# Patient Record
(demographics unavailable — no encounter records)

---

## 2024-11-27 NOTE — REASON FOR VISIT
[Initial Evaluation] : an initial evaluation [Patient] : patient [Parents] : parents [FreeTextEntry1] : Left lateral condyle fracture and wrist contusion sustained on 11/11/24 [TWNoteComboBox1] : Liberian

## 2024-11-27 NOTE — ASSESSMENT
[FreeTextEntry1] : 6-year-old female with a left lateral condyle fracture sustained on 11/11/2024, 2.5 weeks ago, when she fell off her scooter.  -We discussed the history, physical exam, and all available radiographs at length during today's visit with patient and her parent/guardian who served as an independent historian due to child's age and unreliable nature of history. -[place] [facility] [place] FOREARM 2 VIEWS,  HUMERUS MIN 2 VIEWS, WRIST COMP MIN 3 VIEWs and ELBOW COMP MIN 3V [place] [facility] [place] and independently reviewed during today's visit. nondisplaced lateral condyle fracture noted. no signs of interval healing. Skeletally immature patient - Left elbow 3 view radiographs were obtained and independently reviewed during today's visit. Continued visualization of a nondisplaced lateral condyle fracture. Early interval healing noted.  -The etiology, pathoanatomy, treatment modalities, and expected natural history of the injury were discussed at length today. -Clinically, she tolerated her long-arm splint well.  Upon splint removal she has noted to have persistent discomfort about the elbow and wrist. -At this time, based on patient age and current fracture alignment, we recommended conservative management with cast immobilization. -She was placed into a well-padded long-arm cast today.  Cast care instructions reviewed. -Nonweightbearing on the left upper extremity.  Sling at all times. -left elbow 3 view and wrist 3 view IN CAST radiographs were obtained and independently reviewed during today's visit. Continued visualization of a nondisplaced lateral condyle fracture. Early interval healing noted.  -OTC NSAIDs as needed -Absolutely no gym, recess, sports, rough play.  School note provided today. -We will plan to see her back in clinic in approximately 3 weeks for reevaluation and new left elbow radiographs OUT OF CAST.  All questions and concerns were addressed today. Parent and patient verbalize understanding and agree with plan of care.   I, Corinna Herrera, have acted as a scribe and documented the above information for Dr. Garnica

## 2024-11-27 NOTE — DATA REVIEWED
[de-identified] : Left FOREARM 2 VIEWS,  HUMERUS MIN 2 VIEWS, WRIST COMP MIN 3 VIEWs and ELBOW COMP MIN 3V [place] [facility] [place] and independently reviewed during today's visit. nondisplaced lateral condyle fracture noted. no signs of interval healing. Skeletally immature patient  Left elbow 3 view radiographs were obtained and independently reviewed during today's visit. Continued visualization of a nondisplaced lateral condyle fracture. Early interval healing noted.   left elbow 3 view and wrist 3 view IN CAST radiographs were obtained and independently reviewed during today's visit. Continued visualization of a nondisplaced lateral condyle fracture. Early interval healing noted.

## 2024-11-27 NOTE — REVIEW OF SYSTEMS
[Change in Activity] : change in activity [Joint Pains] : arthralgias [Joint Swelling] : joint swelling  [Appropriate Age Development] : development appropriate for age [Redness] : no redness [Murmur] : no murmur [Wheezing] : no wheezing [Vomiting] : no vomiting

## 2024-11-27 NOTE — HISTORY OF PRESENT ILLNESS
[FreeTextEntry1] : Manuela is a 6-year-old female with a left upper extremity injury sustained on 11/11/2024.  Per report she fell off her scooter and had immediate pain and discomfort about the left elbow.  There was also significant swelling about the elbow.  She presented to [facility] where radiographs were obtained, and no obvious fracture was noted.  She was placed into a splint, and it was recommended she follow-up with pediatric orthopedics.  Today, her parents report she is overall doing well.  They left the splint in place since it was provided.  She can flex and extend her fingers without discomfort.  She is not requiring pain medication.  She denies any pain about the ipsilateral shoulder.  She presents today for initial evaluation of her left upper extremity injury.

## 2024-11-27 NOTE — PHYSICAL EXAM
[FreeTextEntry1] : GENERAL: alert, cooperative, in NAD SKIN: The skin is intact, warm, pink and dry over the area examined. EYES: Normal conjunctiva, normal eyelids and pupils were equal and round. ENT: normal ears, normal nose and normal lips. CARDIOVASCULAR: brisk capillary refill, but no peripheral edema. RESPIRATORY: The patient is in no apparent respiratory distress. They're taking full deep breaths without use of accessory muscles or evidence of audible wheezes or stridor without the use of a stethoscope. Normal respiratory effort. ABDOMEN: not examined.  Left upper extremity-  long arm splint in place, removed for examination Scattered bruising and swelling about the wrist and elbow Global tenderness about the elbow Mild discomfort about the distal radius ROM of the elbow deferred Mild discomfort with gentle wrist range of motion. Fingers are warm, pink, and moving freely.  Radial pulse is +2.  Brisk capillary refill in all 5 fingers. Sensation is intact to light touch distally.  Nerve innervation of the upper extremity is intact.  Able to perform a thumbs up maneuver (PIN), OK sign (AIN), finger crossover (ulnar).

## 2024-12-18 NOTE — DATA REVIEWED
[de-identified] : Left elbow 3 view OUT OF CAST radiographs were obtained and independently reviewed during today's visit. Continued visualization of a nondisplaced lateral condyle fracture.  Now with interval healing.  Anterior humeral line intersects the capitellum. Radiocapitellar articulation is intact.   Left wrist 3 view OUT OF CAST radiographs were obtained and independently reviewed during today's visit.  There are no signs of fracture, dislocation, bony injury noted.  No periosteal reaction or callus formation noted

## 2024-12-18 NOTE — PHYSICAL EXAM
[FreeTextEntry1] : GENERAL: alert, cooperative, in NAD SKIN: The skin is intact, warm, pink and dry over the area examined. EYES: Normal conjunctiva, normal eyelids and pupils were equal and round. ENT: normal ears, normal nose and normal lips. CARDIOVASCULAR: brisk capillary refill, but no peripheral edema. RESPIRATORY: The patient is in no apparent respiratory distress. They're taking full deep breaths without use of accessory muscles or evidence of audible wheezes or stridor without the use of a stethoscope. Normal respiratory effort. ABDOMEN: not examined.  Left upper extremity: long arm cast in place, removed for examination Scattered bruising about the wrist and elbow No further swelling No further tenderness about the elbow Mild discomfort about the distal radius ROM of the elbow deferred Mild discomfort with gentle wrist range of motion. Fingers are warm, pink, and moving freely.  Radial pulse is +2.  Brisk capillary refill in all 5 fingers. Sensation is intact to light touch distally.  Nerve innervation of the upper extremity is intact.  Able to perform a thumbs up maneuver (PIN), OK sign (AIN), finger crossover (ulnar).

## 2024-12-18 NOTE — HISTORY OF PRESENT ILLNESS
[FreeTextEntry1] : Manuela is a 6-year-old female with a left upper extremity injury sustained on 11/11/2024.  Per report she fell off her scooter and had immediate pain and discomfort about the left elbow.  There was also significant swelling about the elbow.  She presented to [facility] where radiographs were obtained, and no obvious fracture was noted.  She was placed into a splint, and it was recommended she follow-up with pediatric orthopedics.  On initial evaluation she was placed into a long-arm cast. Please see prior clinic notes for additional information.   Today, her parents report she is overall doing well.  She has been tolerating her long-arm cast without discomfort.  She can flex and extend her fingers without discomfort.  She is not requiring pain medication.  She denies any pain about the ipsilateral shoulder.  She presents today for continued management of her left upper extremity injury.

## 2024-12-18 NOTE — REVIEW OF SYSTEMS
[Change in Activity] : change in activity [Joint Pains] : arthralgias [Joint Swelling] : joint swelling  [Appropriate Age Development] : development appropriate for age [Redness] : no redness [Murmur] : no murmur [Wheezing] : no wheezing [Vomiting] : no vomiting [Diarrhea] : no diarrhea [Constipation] : no constipation [Limping] : no limping [Sleep Disturbances] : ~T no sleep disturbances

## 2024-12-18 NOTE — ASSESSMENT
[FreeTextEntry1] : 6-year-old female with a left lateral condyle fracture sustained on 11/11/2024, 5.5 weeks ago, when she fell off her scooter.  -We discussed the interval progress, physical exam, and all available radiographs at length during today's visit with patient and her parent/guardian who served as an independent historian due to child's age and unreliable nature of history. -Left elbow 3 view OUT OF CAST radiographs were obtained and independently reviewed during today's visit. Continued visualization of a nondisplaced lateral condyle fracture.  Now with interval healing.  Anterior humeral line intersects the capitellum. Radiocapitellar articulation is intact.  - Left wrist 3 view OUT OF CAST radiographs were obtained and independently reviewed during today's visit.  There are no signs of fracture, dislocation, bony injury noted.  No periosteal reaction or callus formation noted -The etiology, pathoanatomy, treatment modalities, and expected natural history of the injury were discussed at length today. -Clinically, she tolerated her long-arm cast well.  She denied any pain in the cast today.  -Her long-arm cast was removed today.  She tolerated the procedure well. -She may now begin to work on gentle range of motion of the elbow and wrist.  Sample exercises were demonstrated today. -Nonweightbearing on the left upper extremity.   -OTC NSAIDs as needed -Absolutely no gym, recess, sports, rough play.  School note provided today. -Intricacies of lateral condyle fractures discussed today including risk of AVN, lateral osseous prominence, etc. -We will plan to see her back in clinic in approximately 3 weeks for reevaluation and new left elbow radiographs.   All questions and concerns were addressed today. Parent and patient verbalize understanding and agree with plan of care.   I, Corinna Herrera, have acted as a scribe and documented the above information for Dr. Garnica   This note was created using Dragon Voice Recognition Software and may have been partially created using [employer] software which was then reviewed and edited to the best of my ability. Sporadic inaccurate translation may have occurred. If there are any questions about content of the note, please contact the office for clarification.

## 2024-12-18 NOTE — REASON FOR VISIT
[Patient] : patient [Parents] : parents [Follow Up] : a follow up visit [FreeTextEntry1] : Left lateral condyle fracture and wrist contusion sustained on 11/11/24 [TWNoteComboBox1] : Tajik

## 2025-01-13 NOTE — REASON FOR VISIT
[Follow Up] : a follow up visit [Patient] : patient [Parents] : parents [TWNoteComboBox1] : Yemeni [FreeTextEntry1] : Left lateral condyle fracture and wrist contusion sustained on 11/11/24

## 2025-01-13 NOTE — HISTORY OF PRESENT ILLNESS
[FreeTextEntry1] : Manuela is a 6-year-old female with a left upper extremity injury sustained on 11/11/2024.  Per report she fell off her scooter and had immediate pain and discomfort about the left elbow.  There was also significant swelling about the elbow.  She presented to [facility] where radiographs were obtained, and no obvious fracture was noted.  She was placed into a splint, and it was recommended she follow-up with pediatric orthopedics.  On initial evaluation she was placed into a long-arm cast. Cast removed on 12/18/2024. Please see prior clinic notes for additional information.   Today, her parents report she is overall doing well. She can flex and extend her elbow, wrist, and fingers without discomfort.  She is not requiring pain medication.  She denies any pain about the ipsilateral shoulder.  She presents today for continued management of her left upper extremity injury.

## 2025-01-13 NOTE — REVIEW OF SYSTEMS
[Change in Activity] : change in activity [Joint Pains] : arthralgias [Joint Swelling] : joint swelling  [Appropriate Age Development] : development appropriate for age [Fever Above 102] : no fever [Malaise] : no malaise [Redness] : no redness [Murmur] : no murmur [Wheezing] : no wheezing [Vomiting] : no vomiting [Diarrhea] : no diarrhea [Constipation] : no constipation [Limping] : no limping [Sleep Disturbances] : ~T no sleep disturbances

## 2025-01-13 NOTE — END OF VISIT
[FreeTextEntry3] : IAlberto MD, personally saw and evaluated the patient and developed the plan as documented above. I concur or have edited the note as appropriate.

## 2025-01-13 NOTE — REASON FOR VISIT
[Follow Up] : a follow up visit [Patient] : patient [Parents] : parents [TWNoteComboBox1] : Hungarian [FreeTextEntry1] : Left lateral condyle fracture and wrist contusion sustained on 11/11/24

## 2025-01-13 NOTE — DATA REVIEWED
[de-identified] : Left elbow 3 view radiographs were obtained and independently reviewed during today's visit. Continued visualization of a nondisplaced lateral condyle fracture.  Now with progressive healing.  Anterior humeral line intersects the capitellum. Radiocapitellar articulation is intact.

## 2025-01-13 NOTE — PHYSICAL EXAM
[FreeTextEntry1] : GENERAL: alert, cooperative, in NAD SKIN: The skin is intact, warm, pink and dry over the area examined. EYES: Normal conjunctiva, normal eyelids and pupils were equal and round. ENT: normal ears, normal nose and normal lips. CARDIOVASCULAR: brisk capillary refill, but no peripheral edema. RESPIRATORY: The patient is in no apparent respiratory distress. They're taking full deep breaths without use of accessory muscles or evidence of audible wheezes or stridor without the use of a stethoscope. Normal respiratory effort. ABDOMEN: not examined.  Left upper extremity:  No swelling No tenderness about the elbow No discomfort about the distal radius Full extension of elbow and flexion to about 125-130 degrees. No discomfort with wrist range of motion. Full and symmetric ROM present. Fingers are warm, pink, and moving freely.  Radial pulse is +2.  Brisk capillary refill in all 5 fingers. Sensation is intact to light touch distally.  Nerve innervation of the upper extremity is intact.  Able to perform a thumbs up maneuver (PIN), OK sign (AIN), finger crossover (ulnar).

## 2025-01-13 NOTE — ASSESSMENT
[FreeTextEntry1] : 6-year-old female with a left lateral condyle fracture sustained on 11/11/2024, when she fell off her scooter.  -We discussed the interval progress, physical exam, and all available radiographs at length during today's visit with patient and her parent/guardian who served as an independent historian due to child's age and unreliable nature of history. -Left elbow 3 view radiographs were obtained and independently reviewed during today's visit. Continued visualization of a nondisplaced lateral condyle fracture.  Now with progressive healing.  Anterior humeral line intersects the capitellum. Radiocapitellar articulation is intact. -The etiology, pathoanatomy, treatment modalities, and expected natural history of the injury were discussed at length today. -Clinically, she is doing well without any pain or discomfort. Her elbow ROM has improved. -No heavy lifting with LUE -OTC NSAIDs as needed -Absolutely no gym, recess, sports, rough play.  School note provided today. -Risks of reinjury discussed -Intricacies of lateral condyle fractures discussed today including risk of AVN, lateral osseous prominence, etc. -We will plan to see her back in clinic in approximately 5 weeks for reevaluation and new left elbow radiographs. Anticipate full activity clearance at that time.   All questions and concerns were addressed today. Parent and patient verbalize understanding and agree with plan of care.   IAlyssa, have acted as a scribe and documented the above information for Dr. Garnica.

## 2025-01-13 NOTE — DATA REVIEWED
[de-identified] : Left elbow 3 view radiographs were obtained and independently reviewed during today's visit. Continued visualization of a nondisplaced lateral condyle fracture.  Now with progressive healing.  Anterior humeral line intersects the capitellum. Radiocapitellar articulation is intact.

## 2025-01-13 NOTE — HISTORY OF PRESENT ILLNESS
[FreeTextEntry1] : Manuela is a 6-year-old female with a left upper extremity injury sustained on 11/11/2024.  Per report she fell off her scooter and had immediate pain and discomfort about the left elbow.  There was also significant swelling about the elbow.  She presented to [facility] where radiographs were obtained, and no obvious fracture was noted.  She was placed into a splint, and it was recommended she follow-up with pediatric orthopedics.  On initial evaluation she was placed into a long-arm cast. Cast removed on 12/18/2024. Please see prior clinic notes for additional information.   Today, her parents report she is overall doing well. She can flex and extend her elbow, wrist, and fingers without discomfort.  She is not requiring pain medication.  She denies any pain about the ipsilateral shoulder.  She presents today for continued management of her left upper extremity injury.

## 2025-04-08 NOTE — HISTORY OF PRESENT ILLNESS
[FreeTextEntry1] : Manuela is a 7-year-old female with a left upper extremity injury sustained on 11/11/2024.  Per report she fell off her scooter and had immediate pain and discomfort about the left elbow.  There was also significant swelling about the elbow.  She presented to [facility] where radiographs were obtained, and no obvious fracture was noted.  She was placed into a splint, and it was recommended she follow-up with pediatric orthopedics.  On initial evaluation she was placed into a long-arm cast. Cast removed on 12/18/2024. Please see prior clinic notes for additional information.   Today, her parents report she is overall doing well. She can flex and extend her elbow, wrist, and fingers without discomfort.  She is not taking any pain medication.  She denies any pain about the ipsilateral shoulder.  She presents today for continued management of her left upper extremity injury.

## 2025-04-08 NOTE — REVIEW OF SYSTEMS
[Appropriate Age Development] : development appropriate for age [Change in Activity] : no change in activity [Fever Above 102] : no fever [Malaise] : no malaise [Redness] : no redness [Murmur] : no murmur [Wheezing] : no wheezing [Vomiting] : no vomiting [Diarrhea] : no diarrhea [Constipation] : no constipation [Limping] : no limping [Joint Pains] : no arthralgias [Joint Swelling] : no joint swelling [Sleep Disturbances] : ~T no sleep disturbances

## 2025-04-08 NOTE — REASON FOR VISIT
[Follow Up] : a follow up visit [Patient] : patient [Parents] : parents [FreeTextEntry1] : Left lateral condyle fracture and wrist contusion sustained on 11/11/24

## 2025-04-08 NOTE — PHYSICAL EXAM
[FreeTextEntry1] : GENERAL: alert, cooperative, in NAD SKIN: The skin is intact, warm, pink and dry over the area examined. EYES: Normal conjunctiva, normal eyelids and pupils were equal and round. ENT: normal ears, normal nose and normal lips. CARDIOVASCULAR: brisk capillary refill, but no peripheral edema. RESPIRATORY: The patient is in no apparent respiratory distress. They're taking full deep breaths without use of accessory muscles or evidence of audible wheezes or stridor without the use of a stethoscope. Normal respiratory effort. ABDOMEN: not examined.  Left upper extremity: No gross deformity. No swelling. No tenderness about the elbow. No discomfort about the distal radius. Full flexion and extension of elbow and wrist. No discomfort with elbow and wrist range of motion. Full and symmetric ROM present. Symmetric carrying angle. Fingers are warm, pink, and moving freely.  Radial pulse is +2.  Brisk capillary refill in all 5 fingers. Sensation is intact to light touch distally.  Nerve innervation of the upper extremity is intact.  Able to perform a thumbs up maneuver (PIN), OK sign (AIN), finger crossover (ulnar).

## 2025-04-08 NOTE — HISTORY OF PRESENT ILLNESS
[FreeTextEntry1] : Manuela is a 7-year-old female with a left upper extremity injury sustained on 11/11/2024.  Per report she fell off her scooter and had immediate pain and discomfort about the left elbow.  There was also significant swelling about the elbow.  She presented to [facility] where radiographs were obtained, and no obvious fracture was noted.  She was placed into a splint, and it was recommended she follow-up with pediatric orthopedics.  On initial evaluation she was placed into a long-arm cast. Cast removed on 12/18/2024. Please see prior clinic notes for additional information.   Today, her parents report she is overall doing well. She can flex and extend her elbow, wrist, and fingers without discomfort.  She is not taking any pain medication.  She denies any pain about the ipsilateral shoulder.  She presents today for continued management of her left upper extremity injury.

## 2025-04-08 NOTE — ASSESSMENT
[FreeTextEntry1] : 7-year-old female with a left lateral condyle fracture sustained on 11/11/2024, when she fell off her scooter. Well healed. Doing very well.  -We discussed the interval progress, physical exam, and all available radiographs at length during today's visit with patient and her parent/guardian who served as an independent historian due to child's age and unreliable nature of history. -Left elbow 3 view radiographs were obtained and independently reviewed during today's visit. Healed nondisplaced lateral condyle fracture.  Anterior humeral line intersects the capitellum. Radio capitellar articulation is intact. -The etiology, pathoanatomy, treatment modalities, and expected natural history of the injury were discussed at length today. -Clinically, she is doing well without any pain or discomfort and has full ROM. -She may resume all physical activities without restrictions. School note was provided. -Risks of reinjury discussed -Follow up as needed.   All questions and concerns were addressed today. Parent and patient verbalize understanding and agree with plan of care.   I, Alyssa Goncalves, have acted as a scribe and documented the above information for Dr. Garnica.